# Patient Record
Sex: FEMALE | Race: WHITE
[De-identification: names, ages, dates, MRNs, and addresses within clinical notes are randomized per-mention and may not be internally consistent; named-entity substitution may affect disease eponyms.]

---

## 2020-11-24 ENCOUNTER — HOSPITAL ENCOUNTER (EMERGENCY)
Dept: HOSPITAL 53 - M ED | Age: 25
Discharge: HOME | End: 2020-11-24
Payer: COMMERCIAL

## 2020-11-24 VITALS — SYSTOLIC BLOOD PRESSURE: 125 MMHG | DIASTOLIC BLOOD PRESSURE: 69 MMHG

## 2020-11-24 VITALS — BODY MASS INDEX: 25.57 KG/M2 | HEIGHT: 67 IN | WEIGHT: 162.92 LBS

## 2020-11-24 DIAGNOSIS — R10.9: Primary | ICD-10-CM

## 2020-11-24 DIAGNOSIS — Z20.828: ICD-10-CM

## 2020-11-24 DIAGNOSIS — R11.0: ICD-10-CM

## 2020-11-24 DIAGNOSIS — M25.569: ICD-10-CM

## 2020-11-24 DIAGNOSIS — F41.9: ICD-10-CM

## 2020-11-24 DIAGNOSIS — F33.9: ICD-10-CM

## 2020-11-24 DIAGNOSIS — Z79.899: ICD-10-CM

## 2020-11-24 DIAGNOSIS — J18.9: ICD-10-CM

## 2020-11-24 LAB
ALBUMIN SERPL BCG-MCNC: 3.9 GM/DL (ref 3.2–5.2)
ALT SERPL W P-5'-P-CCNC: 24 U/L (ref 12–78)
BASOPHILS # BLD AUTO: 0 10^3/UL (ref 0–0.2)
BASOPHILS NFR BLD AUTO: 0.3 % (ref 0–1)
BILIRUB CONJ SERPL-MCNC: 0.1 MG/DL (ref 0–0.2)
BILIRUB SERPL-MCNC: 0.4 MG/DL (ref 0.2–1)
EOSINOPHIL # BLD AUTO: 0 10^3/UL (ref 0–0.5)
EOSINOPHIL NFR BLD AUTO: 0.5 % (ref 0–3)
HCT VFR BLD AUTO: 38.3 % (ref 36–47)
HGB BLD-MCNC: 12.8 G/DL (ref 12–15.5)
LIPASE SERPL-CCNC: 119 U/L (ref 73–393)
LYMPHOCYTES # BLD AUTO: 1.8 10^3/UL (ref 1.5–5)
LYMPHOCYTES NFR BLD AUTO: 28.3 % (ref 24–44)
MCH RBC QN AUTO: 29.5 PG (ref 27–33)
MCHC RBC AUTO-ENTMCNC: 33.4 G/DL (ref 32–36.5)
MCV RBC AUTO: 88.2 FL (ref 80–96)
MONOCYTES # BLD AUTO: 0.4 10^3/UL (ref 0–0.8)
MONOCYTES NFR BLD AUTO: 6 % (ref 0–5)
NEUTROPHILS # BLD AUTO: 4 10^3/UL (ref 1.5–8.5)
NEUTROPHILS NFR BLD AUTO: 64.7 % (ref 36–66)
PLATELET # BLD AUTO: 215 10^3/UL (ref 150–450)
PROT SERPL-MCNC: 7.4 GM/DL (ref 6.4–8.2)
RBC # BLD AUTO: 4.34 10^6/UL (ref 4–5.4)
WBC # BLD AUTO: 6.2 10^3/UL (ref 4–10)

## 2020-11-24 PROCEDURE — 96374 THER/PROPH/DIAG INJ IV PUSH: CPT

## 2020-11-24 PROCEDURE — 85025 COMPLETE CBC W/AUTO DIFF WBC: CPT

## 2020-11-24 PROCEDURE — 80047 BASIC METABLC PNL IONIZED CA: CPT

## 2020-11-24 PROCEDURE — 76856 US EXAM PELVIC COMPLETE: CPT

## 2020-11-24 PROCEDURE — 74177 CT ABD & PELVIS W/CONTRAST: CPT

## 2020-11-24 PROCEDURE — 80076 HEPATIC FUNCTION PANEL: CPT

## 2020-11-24 PROCEDURE — 93976 VASCULAR STUDY: CPT

## 2020-11-24 PROCEDURE — 96361 HYDRATE IV INFUSION ADD-ON: CPT

## 2020-11-24 PROCEDURE — 84702 CHORIONIC GONADOTROPIN TEST: CPT

## 2020-11-24 PROCEDURE — 76830 TRANSVAGINAL US NON-OB: CPT

## 2020-11-24 PROCEDURE — 99284 EMERGENCY DEPT VISIT MOD MDM: CPT

## 2020-11-24 PROCEDURE — 83690 ASSAY OF LIPASE: CPT

## 2020-11-24 NOTE — REP
INDICATION:

rlq pain, nausea, loss appetite



COMPARISON:

None.



TECHNIQUE:

CT Scan of the abdomen and pelvis was performed with intravenous administration of 100

cc of Isovue 370, without oral contrast.



FINDINGS:

Lung bases: Mild patchy atelectasis or infiltrate is seen in the left lower lobe.

Liver:  Normal

Gallbladder:  Unremarkable.

Spleen:  Normal.

Adrenals:  Normal.

Pancreas:  Normal.

Kidneys:  Normal.

Small and large bowel: Unremarkable.

Free fluid:  Mild scattered free fluid is seen throughout the pelvis.

Abdominal aorta: No aneurysm or dissection.

Adenopathy:  None.

Appendix: Not inflamed.

Osseous structures:  Unremarkable.

Pelvis:  No mass.



IMPRESSION:

Mild patchy atelectasis or infiltrate left lower lobe.



Mild scattered free fluid throughout the pelvis.



No other acute abnormalities detected.





<Electronically signed by James Foss > 11/24/20 1939

## 2021-02-16 ENCOUNTER — HOSPITAL ENCOUNTER (EMERGENCY)
Dept: HOSPITAL 53 - M ED | Age: 26
Discharge: HOME | End: 2021-02-16
Payer: COMMERCIAL

## 2021-02-16 VITALS — BODY MASS INDEX: 23.59 KG/M2 | HEIGHT: 67 IN | WEIGHT: 150.31 LBS

## 2021-02-16 VITALS — SYSTOLIC BLOOD PRESSURE: 120 MMHG | DIASTOLIC BLOOD PRESSURE: 71 MMHG

## 2021-02-16 DIAGNOSIS — Z32.01: Primary | ICD-10-CM

## 2021-02-16 DIAGNOSIS — O99.341: ICD-10-CM

## 2021-02-16 DIAGNOSIS — O99.891: ICD-10-CM

## 2021-02-16 DIAGNOSIS — Z79.899: ICD-10-CM

## 2021-02-16 DIAGNOSIS — O26.891: ICD-10-CM

## 2021-02-16 DIAGNOSIS — Z87.448: ICD-10-CM

## 2021-02-16 DIAGNOSIS — O20.8: ICD-10-CM

## 2021-02-16 DIAGNOSIS — Z3A.09: ICD-10-CM

## 2021-02-16 LAB
ALBUMIN SERPL BCG-MCNC: 3.4 GM/DL (ref 3.2–5.2)
ALT SERPL W P-5'-P-CCNC: 24 U/L (ref 12–78)
B-HCG SERPL-ACNC: (no result) MIU/ML
BASOPHILS # BLD AUTO: 0 10^3/UL (ref 0–0.2)
BASOPHILS NFR BLD AUTO: 0.3 % (ref 0–1)
BILIRUB CONJ SERPL-MCNC: < 0.1 MG/DL (ref 0–0.2)
BILIRUB SERPL-MCNC: < 0.1 MG/DL (ref 0.2–1)
BUN SERPL-MCNC: 11 MG/DL (ref 7–18)
CALCIUM SERPL-MCNC: 9.3 MG/DL (ref 8.5–10.1)
CHLORIDE SERPL-SCNC: 104 MEQ/L (ref 98–107)
CO2 SERPL-SCNC: 26 MEQ/L (ref 21–32)
CREAT SERPL-MCNC: 0.46 MG/DL (ref 0.55–1.3)
EOSINOPHIL # BLD AUTO: 0.1 10^3/UL (ref 0–0.5)
EOSINOPHIL NFR BLD AUTO: 0.7 % (ref 0–3)
GFR SERPL CREATININE-BSD FRML MDRD: > 60 ML/MIN/{1.73_M2} (ref 60–?)
GLUCOSE SERPL-MCNC: 74 MG/DL (ref 70–100)
HCT VFR BLD AUTO: 36 % (ref 36–47)
HGB BLD-MCNC: 12.1 G/DL (ref 12–15.5)
LIPASE SERPL-CCNC: 123 U/L (ref 73–393)
LYMPHOCYTES # BLD AUTO: 1.6 10^3/UL (ref 1.5–5)
LYMPHOCYTES NFR BLD AUTO: 23.3 % (ref 24–44)
MCH RBC QN AUTO: 29.1 PG (ref 27–33)
MCHC RBC AUTO-ENTMCNC: 33.6 G/DL (ref 32–36.5)
MCV RBC AUTO: 86.5 FL (ref 80–96)
MONOCYTES # BLD AUTO: 0.4 10^3/UL (ref 0–0.8)
MONOCYTES NFR BLD AUTO: 5.7 % (ref 2–8)
NEUTROPHILS # BLD AUTO: 4.8 10^3/UL (ref 1.5–8.5)
NEUTROPHILS NFR BLD AUTO: 69.6 % (ref 36–66)
PLATELET # BLD AUTO: 241 10^3/UL (ref 150–450)
POTASSIUM SERPL-SCNC: 3.7 MEQ/L (ref 3.5–5.1)
PROT SERPL-MCNC: 7.2 GM/DL (ref 6.4–8.2)
RBC # BLD AUTO: 4.16 10^6/UL (ref 4–5.4)
SODIUM SERPL-SCNC: 137 MEQ/L (ref 136–145)
WBC # BLD AUTO: 6.9 10^3/UL (ref 4–10)

## 2021-02-16 NOTE — REP
INDICATION:

lower abd/umbilical pain. Umbilical pain that began while doing strenuous work.



COMPARISON:

Comparison CT study November 24, 2020.



TECHNIQUE:

. limited anterior abdominal wall sonography.  Periumbilical region.



FINDINGS:

Scanning of the umbilicus at rest and with Valsalva is performed.  There is a very

small quantity of abdominal fat extending into the umbilicus through a 6 mm defect

measured at rest.  I note a similar appearance to the umbilicus on November 24, 2020

CT study.  The defect is measured at 11 mm with Valsalva.  No bowel involvement is

seen.



IMPRESSION:

Findings consistent with a very small umbilical hernia trans Fitzgerald abdominal fat

essentially unchanged from the CT appearance 24 November 2020.





<Electronically signed by Jaswant Do > 02/16/21 [id number]

## 2021-02-16 NOTE — REP
INDICATION:

abd pain, 9 weeks preg.



COMPARISON:

None.



TECHNIQUE:

Transabdominal obstetric sonography.



FINDINGS:

Scanning through the urine filled bladder and gravid uterus demonstrates a single

living intrauterine gestation in a free-floating fetal lie.  The crown-rump length of

the embryonic pole is 27 mm.  This corresponds with a gestational age estimate of 9

weeks 3 days.  Fetal heart rate is recorded at 172 beats per minute.  The placenta is

posterior fundal.  There are 2 small anechoic fluid collections adjacent to the

gestation sac.  These measure 7 and 10 mm in greatest diameter respectively.  No extra

uterine abnormality is observed.



IMPRESSION:

Viable single intrauterine gestation at 9 weeks 3 days by crown-rump length.  BHUPENDRA by

sonography 18 September 2021.  There are 2 small subchorionic fluid collections as

above





<Electronically signed by Jaswant Do > 02/16/21 [id number]

## 2023-03-15 ENCOUNTER — HOSPITAL ENCOUNTER (OUTPATIENT)
Dept: HOSPITAL 53 - M PLALAB | Age: 28
End: 2023-03-15
Attending: OBSTETRICS & GYNECOLOGY
Payer: OTHER GOVERNMENT

## 2023-03-15 DIAGNOSIS — Z53.9: Primary | ICD-10-CM

## 2023-03-15 DIAGNOSIS — Z34.81: Primary | ICD-10-CM

## 2023-03-15 LAB
HCT VFR BLD AUTO: 35.8 % (ref 36–47)
HCV AB SER QL: 0 INDEX (ref ?–0.8)
HGB BLD-MCNC: 11.9 G/DL (ref 12–15.5)
HIV 1+2 AB+HIV1 P24 AG SERPL QL IA: NEGATIVE
MCH RBC QN AUTO: 29.3 PG (ref 27–33)
MCHC RBC AUTO-ENTMCNC: 33.2 G/DL (ref 32–36.5)
MCV RBC AUTO: 88.2 FL (ref 80–96)
N GONORRHOEA RRNA SPEC QL NAA+PROBE: NEGATIVE
PLATELET # BLD AUTO: 234 10^3/UL (ref 150–450)
RBC # BLD AUTO: 4.06 10^6/UL (ref 4–5.4)
WBC # BLD AUTO: 5.6 10^3/UL (ref 4–10)

## 2023-03-28 ENCOUNTER — HOSPITAL ENCOUNTER (OUTPATIENT)
Dept: HOSPITAL 53 - M PLALAB | Age: 28
End: 2023-03-28
Attending: OBSTETRICS & GYNECOLOGY
Payer: OTHER GOVERNMENT

## 2023-03-28 DIAGNOSIS — Z34.81: Primary | ICD-10-CM

## 2023-05-24 ENCOUNTER — HOSPITAL ENCOUNTER (OUTPATIENT)
Dept: HOSPITAL 53 - M WHC | Age: 28
End: 2023-05-24
Attending: OBSTETRICS & GYNECOLOGY
Payer: OTHER GOVERNMENT

## 2023-05-24 DIAGNOSIS — Z34.82: Primary | ICD-10-CM

## 2023-05-24 DIAGNOSIS — Z3A.20: ICD-10-CM

## 2023-07-10 ENCOUNTER — HOSPITAL ENCOUNTER (OUTPATIENT)
Dept: HOSPITAL 53 - M PLALAB | Age: 28
End: 2023-07-10
Attending: ADVANCED PRACTICE MIDWIFE
Payer: OTHER GOVERNMENT

## 2023-07-10 ENCOUNTER — HOSPITAL ENCOUNTER (OUTPATIENT)
Dept: HOSPITAL 53 - M WHC | Age: 28
End: 2023-07-10
Attending: ADVANCED PRACTICE MIDWIFE
Payer: COMMERCIAL

## 2023-07-10 DIAGNOSIS — Z34.92: Primary | ICD-10-CM

## 2023-07-10 DIAGNOSIS — Z34.92: ICD-10-CM

## 2023-07-10 DIAGNOSIS — Z11.3: Primary | ICD-10-CM

## 2023-07-10 LAB
HCT VFR BLD AUTO: 33.7 % (ref 36–47)
HGB BLD-MCNC: 11.3 G/DL (ref 12–15.5)
MCH RBC QN AUTO: 30 PG (ref 27–33)
MCHC RBC AUTO-ENTMCNC: 33.5 G/DL (ref 32–36.5)
MCV RBC AUTO: 89.4 FL (ref 80–96)
N GONORRHOEA RRNA SPEC QL NAA+PROBE: NEGATIVE
PLATELET # BLD AUTO: 211 10^3/UL (ref 150–450)
RBC # BLD AUTO: 3.77 10^6/UL (ref 4–5.4)
WBC # BLD AUTO: 7.1 10^3/UL (ref 4–10)

## 2023-08-31 ENCOUNTER — HOSPITAL ENCOUNTER (OUTPATIENT)
Dept: HOSPITAL 53 - M LDO | Age: 28
Discharge: HOME | End: 2023-08-31
Attending: OBSTETRICS & GYNECOLOGY
Payer: OTHER GOVERNMENT

## 2023-08-31 VITALS — HEIGHT: 67 IN | BODY MASS INDEX: 29.38 KG/M2 | WEIGHT: 187.17 LBS

## 2023-08-31 VITALS — SYSTOLIC BLOOD PRESSURE: 126 MMHG | DIASTOLIC BLOOD PRESSURE: 68 MMHG

## 2023-08-31 VITALS — SYSTOLIC BLOOD PRESSURE: 114 MMHG | DIASTOLIC BLOOD PRESSURE: 76 MMHG

## 2023-08-31 DIAGNOSIS — Z3A.34: ICD-10-CM

## 2023-08-31 DIAGNOSIS — N20.9: ICD-10-CM

## 2023-08-31 DIAGNOSIS — O26.833: Primary | ICD-10-CM

## 2023-08-31 LAB
HCT VFR BLD AUTO: 32 % (ref 36–47)
HGB BLD-MCNC: 11 G/DL (ref 12–15.5)
MCH RBC QN AUTO: 29.9 PG (ref 27–33)
MCHC RBC AUTO-ENTMCNC: 34.4 G/DL (ref 32–36.5)
MCV RBC AUTO: 87 FL (ref 80–96)
PLATELET # BLD AUTO: 205 10^3/UL (ref 150–450)
RBC # BLD AUTO: 3.68 10^6/UL (ref 4–5.4)
WBC # BLD AUTO: 10.3 10^3/UL (ref 4–10)

## 2023-08-31 PROCEDURE — 87086 URINE CULTURE/COLONY COUNT: CPT

## 2023-08-31 PROCEDURE — 36415 COLL VENOUS BLD VENIPUNCTURE: CPT

## 2023-08-31 PROCEDURE — 85027 COMPLETE CBC AUTOMATED: CPT

## 2023-08-31 PROCEDURE — 59025 FETAL NON-STRESS TEST: CPT

## 2023-08-31 PROCEDURE — 81001 URINALYSIS AUTO W/SCOPE: CPT

## 2023-08-31 PROCEDURE — 76775 US EXAM ABDO BACK WALL LIM: CPT

## 2023-09-13 ENCOUNTER — HOSPITAL ENCOUNTER (OUTPATIENT)
Dept: HOSPITAL 53 - M PLALAB | Age: 28
End: 2023-09-13
Attending: ADVANCED PRACTICE MIDWIFE
Payer: COMMERCIAL

## 2023-09-13 DIAGNOSIS — Z34.80: Primary | ICD-10-CM

## 2023-09-13 PROCEDURE — 87081 CULTURE SCREEN ONLY: CPT

## 2024-10-25 NOTE — REP
INDICATION:

rlq, right sided pelvic pain.



COMPARISON:

CT abdomen and pelvis today.



TECHNIQUE:

Transabdominal and transvaginal scanning performed.



FINDINGS:

Uterine dimensions are  6.6 x 3.0 x 4.0 cm.  Endometrial echo is 5 mm in AP dimension

and centrally placed.



The bladder measures 3.1 x 5.1 x 8.4 cm.



The right ovary has dimensions of 3.6 x 1.8 x 1.3 cm.  It's Doppler flow is normal

with a resistive index of 0.33.



The left ovary dimensions are 2.2 x 1.6 x 1.7 cm. It's Doppler flow is normal with a

resistive index of 0.61



There is no adnexal mass identified.



Mild free fluid is seen in the cul-de-sac.





IMPRESSION:

No torsion or adnexal mass.  Mild free fluid.





<Electronically signed by James Foss > 11/24/20 [id number] Pt called in, is requesting to schedule explant of ILR device. Will notify provider, Pt expressed would like to schedule closer to December.    PAST MEDICAL HISTORY:  Bipolar disorder